# Patient Record
Sex: MALE | Race: OTHER | NOT HISPANIC OR LATINO | Employment: UNEMPLOYED | URBAN - METROPOLITAN AREA
[De-identification: names, ages, dates, MRNs, and addresses within clinical notes are randomized per-mention and may not be internally consistent; named-entity substitution may affect disease eponyms.]

---

## 2022-04-05 ENCOUNTER — OFFICE VISIT (OUTPATIENT)
Dept: URGENT CARE | Age: 32
End: 2022-04-05
Payer: COMMERCIAL

## 2022-04-05 VITALS
OXYGEN SATURATION: 99 % | RESPIRATION RATE: 16 BRPM | WEIGHT: 211 LBS | SYSTOLIC BLOOD PRESSURE: 129 MMHG | TEMPERATURE: 97.3 F | DIASTOLIC BLOOD PRESSURE: 74 MMHG | HEART RATE: 65 BPM | BODY MASS INDEX: 31.25 KG/M2 | HEIGHT: 69 IN

## 2022-04-05 DIAGNOSIS — Z02.4 DRIVER'S PERMIT PHYSICAL EXAMINATION: Primary | ICD-10-CM

## 2022-04-05 NOTE — PROGRESS NOTES
[facility]        NAME: Antonio Stephens is a 28 y o  male  : 1990    MRN: [id number]  DATE: 2022  TIME: 6:07 PM    Assessment and Plan   's permit physical examination [Z02 4]  1  's permit physical examination     It is my medical option that patient has no history of or evidence of chronic illnesses that would limit their ability to drive a non-commercial vehicle safely  Josué ALEMAN paperwork was filled out to this effect and originals were returned to the patient  Patient Instructions   Drive Safely and Carefully    Chief Complaint     Chief Complaint   Patient presents with    Annual Exam     Pt for Learner's Permit physical         History of Present Illness       28 year old male presents for  licensing physical  Pt reports they have never driven in the state of [place] or any other state  Pt reports they are healthy and denies history of neurologic, neuropsychiatric, and cardiac disease  Pt denies further history of hypertension, uncontrolled epilepsy, uncontrolled diabetes, lapses in consciousness, cognitive impairments, alcohol abuse, and illicit drug use  Pt reports they have all appendages and denies use of prosthetic devices               Review of Systems   Review of Systems   Neurological: Negative for dizziness, tremors, seizures, syncope, weakness, light-headedness, numbness and headaches  Current Medications     No current outpatient medications on file  Current Allergies     Allergies as of 2022    (No Known Allergies)            The following portions of the patient's history were reviewed and updated as appropriate: allergies, current medications, past family history, past medical history, past social history, past surgical history and problem list      History reviewed  No pertinent past medical history  History reviewed  No pertinent surgical history  History reviewed  No pertinent family history        Medications have been verified  Objective   /74   Pulse 65   Temp (!) 97 3 °F (36 3 °C)   Resp 16   Ht 5' 9" (1 753 m)   Wt 95 7 kg (211 lb)   SpO2 99%   BMI 31 16 kg/m²   No LMP for male patient  Physical Exam     Physical Exam  Vitals and nursing note reviewed  Constitutional:       General: He is awake  He is not in acute distress  Appearance: Normal appearance  He is well-developed and well-groomed  He is not ill-appearing, toxic-appearing or diaphoretic  HENT:      Head: Normocephalic and atraumatic  Right Ear: Hearing, tympanic membrane, ear canal and external ear normal       Left Ear: Hearing, tympanic membrane, ear canal and external ear normal       Nose: Nose normal       Mouth/Throat:      Lips: Pink  No lesions  Mouth: Mucous membranes are moist  No injury, lacerations or oral lesions  Dentition: Normal dentition  No dental tenderness, gingival swelling, dental caries, dental abscesses or gum lesions  Tongue: No lesions  Tongue does not deviate from midline  Palate: No mass and lesions  Pharynx: No pharyngeal swelling, oropharyngeal exudate, posterior oropharyngeal erythema or uvula swelling  Tonsils: No tonsillar exudate  Eyes:      General: Lids are normal  Vision grossly intact  Gaze aligned appropriately  No visual field deficit or scleral icterus  Right eye: No foreign body, discharge or hordeolum  Left eye: No foreign body, discharge or hordeolum  Extraocular Movements: Extraocular movements intact  Conjunctiva/sclera: Conjunctivae normal       Pupils: Pupils are equal, round, and reactive to light  Pupils are equal       Visual Fields: Right eye visual fields normal and left eye visual fields normal       Comments: Left eye vision: 20/30  Right eye vision: 20/30  Both eye vision: 20/30   Neck:      Thyroid: No thyroid mass or thyromegaly  Vascular: No carotid bruit     Cardiovascular:      Rate and Rhythm: Normal rate and regular rhythm  Pulses:           Carotid pulses are 2+ on the right side and 2+ on the left side  Radial pulses are 2+ on the right side and 2+ on the left side  Posterior tibial pulses are 2+ on the right side and 2+ on the left side  Heart sounds: S1 normal and S2 normal  Heart sounds not distant  No murmur heard  No friction rub  No gallop  Pulmonary:      Effort: Pulmonary effort is normal       Breath sounds: Normal breath sounds and air entry  No stridor, decreased air movement or transmitted upper airway sounds  No decreased breath sounds, wheezing, rhonchi or rales  Abdominal:      General: Abdomen is flat  Bowel sounds are normal       Palpations: Abdomen is soft  Tenderness: There is no abdominal tenderness  Musculoskeletal:      Cervical back: Normal range of motion and neck supple  Right lower leg: No edema  Left lower leg: No edema  Right foot: Normal range of motion  Left foot: Normal range of motion  Comments: Pt has full ROM about all UE and LE joints  Strength is 5/5 BUE and BLE   Lymphadenopathy:      Cervical: No cervical adenopathy  Skin:     General: Skin is warm and dry  Neurological:      General: No focal deficit present  Mental Status: He is alert and oriented to person, place, and time  Cranial Nerves: Cranial nerves are intact  Sensory: Sensation is intact  Motor: Motor function is intact  Coordination: Coordination is intact  Gait: Gait is intact  Deep Tendon Reflexes: Reflexes are normal and symmetric  Psychiatric:         Attention and Perception: Attention and perception normal          Mood and Affect: Mood and affect normal          Speech: Speech normal          Behavior: Behavior normal  Behavior is cooperative  Thought Content:  Thought content normal          Judgment: Judgment normal                Note: Portions of this record may have been created with voice recognition software  Occasional wrong word or "sound a like" substitutions may have occurred due to the inherent limitations of voice recognition software  Please read the chart carefully and recognize, using context, where substitutions have occurred  *